# Patient Record
Sex: MALE | Race: OTHER
[De-identification: names, ages, dates, MRNs, and addresses within clinical notes are randomized per-mention and may not be internally consistent; named-entity substitution may affect disease eponyms.]

---

## 2020-06-05 ENCOUNTER — HOSPITAL ENCOUNTER (EMERGENCY)
Dept: HOSPITAL 61 - ER | Age: 25
Discharge: LEFT BEFORE DISCHARGE | End: 2020-06-05
Payer: SELF-PAY

## 2020-06-05 VITALS — HEIGHT: 66 IN | WEIGHT: 143.3 LBS | BODY MASS INDEX: 23.03 KG/M2

## 2020-06-05 VITALS — DIASTOLIC BLOOD PRESSURE: 89 MMHG | SYSTOLIC BLOOD PRESSURE: 179 MMHG
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
## 2020-06-05 DIAGNOSIS — R60.0: ICD-10-CM
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
## 2020-06-05 DIAGNOSIS — S92.111A: Primary | ICD-10-CM

## 2020-06-05 PROCEDURE — 73610 X-RAY EXAM OF ANKLE: CPT

## 2020-06-05 PROCEDURE — 99284 EMERGENCY DEPT VISIT MOD MDM: CPT

## 2020-06-05 PROCEDURE — 73590 X-RAY EXAM OF LOWER LEG: CPT

## 2020-06-05 PROCEDURE — 29515 APPLICATION SHORT LEG SPLINT: CPT
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
